# Patient Record
Sex: FEMALE | Race: WHITE | Employment: OTHER | URBAN - METROPOLITAN AREA
[De-identification: names, ages, dates, MRNs, and addresses within clinical notes are randomized per-mention and may not be internally consistent; named-entity substitution may affect disease eponyms.]

---

## 2017-01-01 ENCOUNTER — TRANSFERRED RECORDS (OUTPATIENT)
Dept: HEALTH INFORMATION MANAGEMENT | Facility: CLINIC | Age: 82
End: 2017-01-01

## 2017-01-01 ENCOUNTER — TELEPHONE (OUTPATIENT)
Dept: FAMILY MEDICINE | Facility: CLINIC | Age: 82
End: 2017-01-01

## 2017-01-01 ENCOUNTER — OFFICE VISIT (OUTPATIENT)
Dept: FAMILY MEDICINE | Facility: CLINIC | Age: 82
End: 2017-01-01
Payer: MEDICARE

## 2017-01-01 ENCOUNTER — DOCUMENTATION ONLY (OUTPATIENT)
Dept: OTHER | Facility: CLINIC | Age: 82
End: 2017-01-01

## 2017-01-01 ENCOUNTER — DOCUMENTATION ONLY (OUTPATIENT)
Dept: CARDIOLOGY | Facility: CLINIC | Age: 82
End: 2017-01-01

## 2017-01-01 VITALS
HEIGHT: 63 IN | DIASTOLIC BLOOD PRESSURE: 81 MMHG | SYSTOLIC BLOOD PRESSURE: 132 MMHG | OXYGEN SATURATION: 100 % | TEMPERATURE: 98.2 F | HEART RATE: 75 BPM | WEIGHT: 150 LBS | BODY MASS INDEX: 26.58 KG/M2

## 2017-01-01 VITALS
WEIGHT: 150 LBS | HEIGHT: 63 IN | BODY MASS INDEX: 26.58 KG/M2 | HEART RATE: 61 BPM | SYSTOLIC BLOOD PRESSURE: 112 MMHG | TEMPERATURE: 97.6 F | DIASTOLIC BLOOD PRESSURE: 62 MMHG | OXYGEN SATURATION: 95 %

## 2017-01-01 DIAGNOSIS — K20.80 RADIATION-INDUCED ESOPHAGITIS: ICD-10-CM

## 2017-01-01 DIAGNOSIS — F33.41 RECURRENT MAJOR DEPRESSIVE DISORDER, IN PARTIAL REMISSION (H): ICD-10-CM

## 2017-01-01 DIAGNOSIS — M34.9 SCLERODERMA (H): ICD-10-CM

## 2017-01-01 DIAGNOSIS — T66.XXXA RADIATION-INDUCED ESOPHAGITIS: ICD-10-CM

## 2017-01-01 DIAGNOSIS — Z71.89 ACP (ADVANCE CARE PLANNING): Chronic | ICD-10-CM

## 2017-01-01 DIAGNOSIS — F33.41 RECURRENT MAJOR DEPRESSIVE DISORDER, IN PARTIAL REMISSION (H): Primary | ICD-10-CM

## 2017-01-01 DIAGNOSIS — I10 BENIGN ESSENTIAL HYPERTENSION: ICD-10-CM

## 2017-01-01 DIAGNOSIS — I73.00 RAYNAUD'S SYNDROME: ICD-10-CM

## 2017-01-01 DIAGNOSIS — I26.99 OTHER ACUTE PULMONARY EMBOLISM WITHOUT ACUTE COR PULMONALE (H): ICD-10-CM

## 2017-01-01 DIAGNOSIS — C34.92 NON-SMALL CELL CARCINOMA OF LUNG, STAGE 4, LEFT (H): ICD-10-CM

## 2017-01-01 DIAGNOSIS — C34.92 PRIMARY CANCER OF LEFT LUNG METASTATIC TO OTHER SITE (H): Primary | ICD-10-CM

## 2017-01-01 PROCEDURE — 99214 OFFICE O/P EST MOD 30 MIN: CPT | Performed by: INTERNAL MEDICINE

## 2017-01-01 PROCEDURE — 99213 OFFICE O/P EST LOW 20 MIN: CPT | Performed by: INTERNAL MEDICINE

## 2017-01-01 RX ORDER — METOPROLOL SUCCINATE 25 MG/1
25 TABLET, EXTENDED RELEASE ORAL 2 TIMES DAILY
Qty: 45 TABLET | Refills: 0 | COMMUNITY

## 2017-01-01 RX ORDER — NIFEDIPINE 30 MG/1
30 TABLET, EXTENDED RELEASE ORAL DAILY
COMMUNITY
Start: 2017-01-01

## 2017-01-01 RX ORDER — POLYETHYLENE GLYCOL 3350 17 G/17G
1 POWDER, FOR SOLUTION ORAL DAILY
Qty: 510 G | Refills: 1 | COMMUNITY

## 2017-01-01 RX ORDER — BUPROPION HYDROCHLORIDE 100 MG/1
100 TABLET ORAL DAILY
Qty: 90 TABLET | COMMUNITY
End: 2017-01-01

## 2017-01-01 RX ORDER — UREA 10 %
1000 LOTION (ML) TOPICAL DAILY
COMMUNITY
End: 2017-01-01

## 2017-01-01 RX ORDER — BUPROPION HYDROCHLORIDE 100 MG/1
100 TABLET ORAL DAILY
Qty: 90 TABLET | Refills: 1 | Status: SHIPPED | OUTPATIENT
Start: 2017-01-01

## 2017-01-01 RX ORDER — ASPIRIN 81 MG
200 TABLET, DELAYED RELEASE (ENTERIC COATED) ORAL DAILY
Qty: 60 TABLET | Refills: 1 | COMMUNITY

## 2017-01-01 RX ORDER — PANTOPRAZOLE SODIUM 40 MG/1
40 TABLET, DELAYED RELEASE ORAL 2 TIMES DAILY
Qty: 30 TABLET | Refills: 1 | COMMUNITY
End: 2017-01-01

## 2017-01-01 RX ORDER — BENZONATATE 100 MG/1
100 CAPSULE ORAL 3 TIMES DAILY PRN
Qty: 42 CAPSULE | COMMUNITY
End: 2017-01-01

## 2017-01-01 RX ORDER — LORAZEPAM 1 MG/1
0.5 TABLET ORAL EVERY 6 HOURS PRN
Qty: 4 TABLET | Refills: 0 | COMMUNITY
End: 2017-01-01

## 2017-01-01 RX ORDER — SENNOSIDES A AND B 8.6 MG/1
2 TABLET, FILM COATED ORAL 2 TIMES DAILY
Qty: 120 TABLET | COMMUNITY

## 2017-01-01 RX ORDER — LIDOCAINE 50 MG/G
PATCH TOPICAL
Qty: 30 PATCH | Refills: 0 | COMMUNITY
End: 2017-01-01

## 2017-01-01 RX ORDER — HYDROMORPHONE HYDROCHLORIDE 2 MG/1
4 TABLET ORAL
Qty: 18 TABLET | Refills: 0 | COMMUNITY

## 2017-01-01 RX ORDER — FENTANYL 12.5 UG/1
1 PATCH TRANSDERMAL
Refills: 0 | COMMUNITY
End: 2017-01-01

## 2017-01-02 PROBLEM — K20.80 RADIATION-INDUCED ESOPHAGITIS: Status: ACTIVE | Noted: 2017-01-01

## 2017-01-02 PROBLEM — T66.XXXA RADIATION-INDUCED ESOPHAGITIS: Status: ACTIVE | Noted: 2017-01-01

## 2017-01-02 NOTE — PATIENT INSTRUCTIONS
If you are having more bowel problems let us know    Stop the calcium and vitamin D as this may constipate you    You can try stopping the pain patch and if the pain worsens we can always resume it.    Don't be afraid to use the dilaudid as needed, especially once you are off the pain patch    Make sure to participate in physical therapy as much as you can    Call if you need anything    Juanjo Castañeda M.D.

## 2017-01-02 NOTE — MR AVS SNAPSHOT
After Visit Summary   1/2/2017    Stephanie Ruiz    MRN: [id number]           Patient Information     Date Of Birth          1935        Visit Information        Provider Department      1/2/2017 2:00 PM Juanjo Castañeda MD [facility]        Today's Diagnoses     Primary cancer of left lung metastatic to other site (H)    -  1     Non-small cell carcinoma of lung, stage 4, left (H)         Other acute pulmonary embolism without acute cor pulmonale (H)         Benign essential hypertension           Care Instructions    If you are having more bowel problems let us know    Stop the calcium and vitamin D as this may constipate you    You can try stopping the pain patch and if the pain worsens we can always resume it.    Don't be afraid to use the dilaudid as needed, especially once you are off the pain patch    Make sure to participate in physical therapy as much as you can    Call if you need anything    Juanjo Castañeda M.D.          Follow-ups after your visit        Who to contact     If you have questions or need follow up information about today's clinic visit or your schedule please contact [facility] directly at [phone].  Normal or non-critical lab and imaging results will be communicated to you by MyChart, letter or phone within 4 business days after the clinic has received the results. If you do not hear from us within 7 days, please contact the clinic through [employer] or phone. If you have a critical or abnormal lab result, we will notify you by phone as soon as possible.  Submit refill requests through [employer] or call your pharmacy and they will forward the refill request to us. Please allow 3 business days for your refill to be completed.          Additional Information About Your Visit        MyChart Information     [employer] lets you send messages to your doctor, view your test results, renew your prescriptions, schedule appointments and more. To sign up, go  "to [email or web address] . Click on \"Log in\" on the left side of the screen, which will take you to the Welcome page. Then click on \"Sign up Now\" on the right side of the page.     You will be asked to enter the access code listed below, as well as some personal information. Please follow the directions to create your username and password.     Your access code is: [email or web address]  Expires: 2017  2:25 PM     Your access code will  in 90 days. If you need help or a new code, please call your [place] clinic or [phone].        Your Vitals Were     Pulse Temperature Height BMI (Body Mass Index) Pulse Oximetry Breastfeeding?    61 97.6  F (36.4  C) (Oral) 5' 3\" (1.6 m) 26.58 kg/m2 95% No       Blood Pressure from Last 3 Encounters:   17 112/62   16 126/85   16 145/75    Weight from Last 3 Encounters:   17 150 lb (68.04 kg)   16 150 lb (68.04 kg)   16 151 lb (68.493 kg)              Today, you had the following     No orders found for display         Today's Medication Changes          These changes are accurate as of: 17  2:25 PM.  If you have any questions, ask your nurse or doctor.               These medicines have changed or have updated prescriptions.        Dose/Directions    cyanocolbalamin 500 MCG tablet   Commonly known as:  vitamin  B-12   This may have changed:  how much to take   Changed by:  Juanjo Castañeda MD        Dose:  1000 mcg   Take 2 tablets (1,000 mcg) by mouth daily   Refills:  0       metoprolol 25 MG 24 hr tablet   Commonly known as:  TOPROL-XL   This may have changed:  See the new instructions.   Used for:  Benign essential hypertension   Changed by:  Juanjo Castañeda MD        Dose:  50 mg   Take 2 tablets (50 mg) by mouth daily   Quantity:  45 tablet   Refills:  0       NIFEdipine ER 60 MG 24 hr tablet   Commonly known as:  ADALAT CC   This may have changed:  Another medication with the same name was removed. Continue taking " this medication, and follow the directions you see here.   Used for:  Raynaud's syndrome   Changed by:  Juanjo Castañeda MD        Dose:  60 mg   Take 1 tablet (60 mg) by mouth daily   Quantity:  90 tablet   Refills:  3       zolpidem 10 MG tablet   Commonly known as:  AMBIEN   This may have changed:  See the new instructions.   Used for:  Primary insomnia        TAKE 1/2 TABLET BY MOUTH AT BEDTIME AS NEEDED FOR SLEEP   Quantity:  90 tablet   Refills:  0         Stop taking these medicines if you haven't already. Please contact your care team if you have questions.     BLINK TEARS 0.25 % Soln ophthalmic solution   Generic drug:  polyethylene glycol 400   Stopped by:  Juanjo Castañeda MD           CLINDAMYCIN HCL PO   Stopped by:  Juanjo Castañeda MD           omeprazole 20 MG CR capsule   Commonly known as:  priLOSEC   Stopped by:  Juanjo Castañeda MD           rivaroxaban ANTICOAGULANT 20 MG Tabs tablet   Commonly known as:  XARELTO   Stopped by:  Juanjo Castañeda MD                    Primary Care Provider Office Phone # Fax #    Juanjo Castañeda [phone]       [facility] [address]  [facility] [address]        Thank you!     Thank you for choosing [facility]  for your care. Our goal is always to provide you with excellent care. Hearing back from our patients is one way we can continue to improve our services. Please take a few minutes to complete the written survey that you may receive in the mail after your visit with us. Thank you!             Your Updated Medication List - Protect others around you: Learn how to safely use, store and throw away your medicines at www.disposemymeds.org.          This list is accurate as of: 1/2/17  2:25 PM.  Always use your most recent med list.                   Brand Name Dispense Instructions for use    acetaminophen 325 MG tablet    TYLENOL     Take 2 tablets (650 mg) by mouth every 4 hours  as needed for mild pain or pain Do not take more than 4,000 mg of acetaminophen (Tylenol) in any 24 hour period, and do not consume alcohol while taking acetaminophen due to risk of liver toxicity.       benzonatate 100 MG capsule    TESSALON    42 capsule    Take 1 capsule (100 mg) by mouth 3 times daily as needed for cough       buPROPion 100 MG tablet    WELLBUTRIN    90 tablet    Take 1 tablet (100 mg) by mouth daily       calcium 600 + D 600-400 MG-UNIT per tablet   Generic drug:  calcium-vitamin D      Take 1 tablet by mouth 2 times daily.       CENTRUM Chew      Take 1 tablet by mouth daily       cyanocolbalamin 500 MCG tablet    vitamin  B-12     Take 2 tablets (1,000 mcg) by mouth daily       docusate sodium 100 MG tablet    COLACE    60 tablet    Take 200 mg by mouth daily       DURAGESIC 12 mcg/hr 72 hr patch   Generic drug:  fentaNYL      Place 1 patch onto the skin every 72 hours       * EFFEXOR  MG 24 hr capsule   Generic drug:  venlafaxine      Take 150 mg by mouth every morning       * venlafaxine 75 MG Tb24 24 hr tablet    EFFEXOR-ER     Take 75 mg by mouth daily (with dinner)       HYDROmorphone 2 MG tablet    DILAUDID    18 tablet    Take 2 tablets (4 mg) by mouth every 3 hours as needed for pain       levothyroxine 125 MCG tablet    SYNTHROID/LEVOTHROID    90 tablet    Take 1 tablet (125 mcg) by mouth daily       lidocaine 5 % Patch    LIDODERM    30 patch    Apply up to 1 patches to painful area at once for up to 12 h within a 24 h period.  Remove after 12 hours.       LORazepam 1 MG tablet    ATIVAN    4 tablet    Take 0.5 tablets (0.5 mg) by mouth every 6 hours as needed for anxiety Take 30 minutes prior to departure.  Do not operate a vehicle after taking this medication       metoprolol 25 MG 24 hr tablet    TOPROL-XL    45 tablet    Take 2 tablets (50 mg) by mouth daily       MIRALAX powder   Generic drug:  polyethylene glycol     510 g    Take 17 g (1 capful) by mouth daily        NIFEdipine ER 60 MG 24 hr tablet    ADALAT CC    90 tablet    Take 1 tablet (60 mg) by mouth daily       PROTONIX 40 MG EC tablet   Generic drug:  pantoprazole     30 tablet    Take 1 tablet (40 mg) by mouth 2 times daily Take 30-60 minutes before a meal.       senna 8.6 MG tablet    SENOKOT    120 tablet    Take 2 tablets by mouth 2 times daily       Thiamine 50 MG Caps      Take 100 mg by mouth every evening       zolpidem 10 MG tablet    AMBIEN    90 tablet    TAKE 1/2 TABLET BY MOUTH AT BEDTIME AS NEEDED FOR SLEEP       * Notice:  This list has 2 medication(s) that are the same as other medications prescribed for you. Read the directions carefully, and ask your doctor or other care provider to review them with you.

## 2017-01-02 NOTE — PROGRESS NOTES
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  Follow up needed: none  Other Healthcare Providers Involved in Patient s Care:         Specialist appointment - March with Dr. Arizmendi of oncology  Update since discharge: improved.     Post Discharge Medication Reconciliation: discharge medications reconciled and changed, per note/orders (see AVS).  Plan of care communicated with patient     Coding guidelines for this visit:  Type of Medical   Decision Making Face-to-Face Visit       within 7 Days of discharge Face-to-Face Visit        within 14 days of discharge   Moderate Complexity [id number]   High Complexity [id number]          Stephanie Ruiz is a 81 year old female who presents  For hosp then tcu follow up, out about 2 weeks, as noted hosp anw for radiation induced esoph with chest pain and dysphagia, then out to tcu and now back home with home care.  Has boyfriend who also helps.  She has known alfredo lung ca, non small cell with bx proven met at T8, had xrt both areas, then due to chest pain and dysphagia hosp as noted.  Now on pain meds, back home.  Has chronic low back pain and abdomen pain for years, not new or changed.  Chest pain much improved, eating better, no n/v, bm sluggish, no f,c,s.  Notes fatigue and tired, using walker and has home physical therapy, can get around some with walker.  Not really using  the pain patch often, usually forgets, also not using the dilaudid.  Is taking ambien 10mg and needs that dose to sleep, has done it for years.  I again explained to patient risk of confusion, falls, etc.  She has prior small pulmonary embolis, but off xarelto since hosp stay, does not want to be on it.  Was having more depression but then got wellbutrin in tcu and that is improved.  She has raynauds and scleroderma, has been stable.    Past Medical History   Diagnosis Date     Urinary, incontinence, stress female      Hypothyroidism      HTN (hypertension)      Hypercholesteremia      Depression, major, in partial remission (H)      GERD (gastroesophageal reflux disease)      Idiopathic anaphylaxis      Chest pain 2009     neg r/o, neg nuclear est     Abdominal pain 2006     ct abd neg, egd done, mrcp nl, be tics, ugi hh     Obesity 2007     lap band surgery [facility]     Cholesterol embolism 2007     carotids neg     Pacemaker 2009     [place]     Colonic polyp 2011     [place], one polyp     Spinal stenosis of lumbar region 2006     by mri     Leg pain, bilateral 2012     eval by Dr. Villegas and micki nl.     Carotid bruit 2012     bilat carotid us no signif stenosis     Subclavian vein stenosis, right 2/13     seen on ct     Lung mass 2/13     seen on ct, had fu Dr. Arizmendi of onc, fu done 2015 and per pt no fu needed     SONIA (iron deficiency anaemia) 2013     colonoscopy with 2mm polyp, egd with previous surgical changes and gastric ulcer     SOB (shortness of breath) 2/12     nl est thallium, nl ef     Raynaud's disease      Dyspnea on exertion      fu [facility] 10/14 and felt to be multifact, due to decond, pain, chest wall limitations, mild copd     Scleroderma (H)      enlarged fingers, edematous hands     Aortic valve replaced      Diastolic heart failure (H) 2013     hosp [facility]     S/P coronary angiogram 10/13     pre op, min cad     Insomnia      Dysphagia 2014     eval [facility], egd no cause found     Pseudoclaudication       l2-l3, eval [place]     Pulmonary hypertension (H)      Acute coronary syndrome (H)      Aortic stenosis      Diastolic heart failure (H)      Pulmonary emboli (H) 8/16     hosp [facility], left lower lobe     Non-small cell carcinoma of lung, stage 4, left (H)      xrt done 11/16      Primary cancer of left lung metastatic to other site (H)      t8 vert, had xrt 11/16     Past Surgical History   Procedure Laterality Date     Spinal stenosis surgery  2012     [place]     Stress urinary incont surgery  2002     Torn achilles surgery       Cholecystectomy       Rhinoplasty       Cataract iol, rt/lt       Retinal surgery  2007     Lid lag surgery  2008 and 2009     Kim en y bowel  2007      [facility]     Macular hole surgery  2010     Hrw pacemaker permanent  2009     Colonoscopy       Replace valve aortic  11/1/2013     Procedure: REPLACE VALVE AORTIC;  AORTIC VALVE REPLACEMENT WITH LISA , PERICARDIAL PATCH ANNULOPLASY, VENTRICULAR PACING WIRES.  (ON PUMP) ;  Surgeon: Anthony West MD;  Location: [facility] OR     Implant pacemaker       Social History     Social History     Marital Status:      Spouse Name: N/A     Number of Children: 2     Years of Education: N/A     Occupational History      Retired     Social History Main Topics     Smoking status: Former Smoker -- 1.50 packs/day for 41 years     Quit date: 06/01/1985     Smokeless tobacco: Never Used     Alcohol Use: Yes      Comment: 1 weekly     Drug Use: No     Sexual Activity: No     Other Topics Concern     Caffeine Concern No     none recently     Exercise Yes     Seat Belt Yes     Social History Narrative     Current Outpatient Prescriptions   Medication Sig Dispense Refill     benzonatate (TESSALON) 100 MG capsule Take 1 capsule (100 mg) by mouth 3 times daily as needed for cough 42 capsule      HYDROmorphone (DILAUDID) 2 MG tablet Take 2 tablets (4 mg) by mouth every 3 hours as needed for pain 18 tablet 0     docusate sodium (COLACE) 100 MG tablet Take  200 mg by mouth daily 60 tablet 1     fentaNYL (DURAGESIC) 12 mcg/hr 72 hr patch Place 1 patch onto the skin every 72 hours  0     lidocaine (LIDODERM) 5 % Patch Apply up to 1 patches to painful area at once for up to 12 h within a 24 h period.  Remove after 12 hours. 30 patch 0     LORazepam (ATIVAN) 1 MG tablet Take 0.5 tablets (0.5 mg) by mouth every 6 hours as needed for anxiety Take 30 minutes prior to departure.  Do not operate a vehicle after taking this medication 4 tablet 0     metoprolol (TOPROL-XL) 25 MG 24 hr tablet Take 2 tablets (50 mg) by mouth daily 45 tablet 0     polyethylene glycol (MIRALAX) powder Take 17 g (1 capful) by mouth daily 510 g 1     pantoprazole (PROTONIX) 40 MG EC tablet Take 1 tablet (40 mg) by mouth 2 times daily Take 30-60 minutes before a meal. 30 tablet 1     senna (SENOKOT) 8.6 MG tablet Take 2 tablets by mouth 2 times daily 120 tablet      Thiamine 50 MG CAPS Take 100 mg by mouth every evening       cyanocolbalamin (VITAMIN  B-12) 500 MCG tablet Take 2 tablets (1,000 mcg) by mouth daily       buPROPion (WELLBUTRIN) 100 MG tablet Take 1 tablet (100 mg) by mouth daily 90 tablet      zolpidem (AMBIEN) 10 MG tablet TAKE 1/2 TABLET BY MOUTH AT BEDTIME AS NEEDED FOR SLEEP (Patient taking differently: 10 mg BY MOUTH AT BEDTIME AS NEEDED FOR SLEEP) 90 tablet 0     levothyroxine (SYNTHROID, LEVOTHROID) 125 MCG tablet Take 1 tablet (125 mcg) by mouth daily 90 tablet 2     NIFEdipine (ADALAT CC) 60 MG 24 hr tablet Take 1 tablet (60 mg) by mouth daily 90 tablet 3     acetaminophen (TYLENOL) 325 MG tablet Take 2 tablets (650 mg) by mouth every 4 hours as needed for mild pain or pain Do not take more than 4,000 mg of acetaminophen (Tylenol) in any 24 hour period, and do not consume alcohol while taking acetaminophen due to risk of liver toxicity.       venlafaxine (EFFEXOR-ER) 75 MG TB24 Take 75 mg by mouth daily (with dinner)       Multiple Vitamins-Minerals (CENTRUM) CHEW Take 1 tablet by  "mouth daily       calcium-vitamin D (CALCIUM 600 + D) 600-400 MG-UNIT per tablet Take 1 tablet by mouth 2 times daily.       venlafaxine (EFFEXOR XR) 150 MG 24 hr capsule Take 150 mg by mouth every morning        [DISCONTINUED] metoprolol (TOPROL-XL) 25 MG 24 hr tablet TAKE 1/2 TABLET BY MOUTH TWICE DAILY 45 tablet 0     [DISCONTINUED] NIFEdipine osmotic (NIFEDICAL XL) 30 MG 24 hr tablet Take 30 mg by mouth At Bedtime  90 tablet 1     [DISCONTINUED] cyanocolbalamin (VITAMIN  B-12) 500 MCG tablet Take 500 mcg by mouth daily.       [DISCONTINUED] BELLADONNA ALKALOIDS 1 tablet every 6 hours as needed.       Allergies   Allergen Reactions     Codeine Camsylate Unknown     Novocain [Procaine]      With epinephrine:  Tachycardia, chest pressure     Penicillins Hives     FAMILY HISTORY NOTED AND REVIEWED    REVIEW OF SYSTEMS: above    PHYSICAL EXAM    /62 mmHg  Pulse 61  Temp(Src) 97.6  F (36.4  C) (Oral)  Ht 5' 3\" (1.6 m)  Wt 150 lb (68.04 kg)  BMI 26.58 kg/m2  SpO2 95%  Breastfeeding? No    Patient appears non toxic  Lungs clear  cv reglar rate and rhythm, no jvp or edema  Abdomen non-tender    ASSESSMENT:  1. Lung ca with t8 met, s/p xrt to both with rad induced esoph, now improved.  To follow up with onc March.  Has fairly good pain control and will try less and see if bm better and not worse  2. Chronic low back pain, contributing  3. Scleroderma, stable  4. Pulmonary embolis, off med, higher risk of this but wants to be off anticoag  5. Rad induced esoph, stable  6. Hypertension, no need for tight control, if drops can lower meds    PLAN:  Ok to try off fentanyl patch, use diluadid prn  Call if bm issues  Stop calcium and vit d  Follow up onc  Call if problems    Juanjo Castañeda M.D.                  "

## 2017-01-03 NOTE — TELEPHONE ENCOUNTER
Pending Prescriptions:                       Disp   Refills    buPROPion (WELLBUTRIN) 100 MG tablet      90 tab*             Sig: Take 1 tablet (100 mg) by mouth daily          Last Written Prescription Date:    Last Fill Quantity: ,   # refills:   Last Office Visit with [facility], [facility] or [facility] prescribing provider: 01/02/17  Future Office visit:       Routing refill request to provider for review/approval because:  Medication is reported/historical

## 2017-01-03 NOTE — TELEPHONE ENCOUNTER
Reason for Call:  Medication or medication refill:    Do you use a [facility]?  Name of the pharmacy and phone number for the current request:         [facility] [address] [place] [address] [address] AT [facility]             Name of the medication requested:  buPROPion (WELLBUTRIN) 100 MG tablet 90 tablet    --      Sig: Take 1 tablet (100 mg) by mouth daily         Other request:  Please refax script to the pharmacy    Can we leave a detailed message on this number? YES    Phone number patient can be reached at: Home number on file [phone] (home)    Best Time: anytiem    Call taken on 1/3/2017 at 3:14 PM by Thu Lance

## 2017-01-04 PROBLEM — F33.41 RECURRENT MAJOR DEPRESSIVE DISORDER, IN PARTIAL REMISSION (H): Status: ACTIVE | Noted: 2017-01-01

## 2017-01-04 NOTE — TELEPHONE ENCOUNTER
Routing refill request to provider for review/approval because:  Medication is reported/historical     Please approve if appropriate  Shara Solis RN

## 2017-02-23 NOTE — TELEPHONE ENCOUNTER
PA has been submitted to [employer] ([facility]) for further review.    Javad Conroy, CMA

## 2017-02-28 NOTE — TELEPHONE ENCOUNTER
PA has been approved for the pantoprazole from 11/25/16 - 2/23/18. I called the pharmacy to let them know.  Javad Conroy, CMA

## 2017-03-20 NOTE — TELEPHONE ENCOUNTER
Reason for Call:  Other prescription    Detailed comments: Stephanie Ruiz has stage 4 cancer and has mixed up her medication. She needs to speak with someone.       Phone Number Patient can be reached at: Home number on file [phone] (home)    Best Time: ASAP     Can we leave a detailed message on this number? YES    Call taken on 3/20/2017 at 10:39 AM by Lucy Almonte, Patient Representative

## 2017-03-20 NOTE — TELEPHONE ENCOUNTER
Needs refill of Pantoprazole.  I called this to pharmacy; (looks like she may have a refill remaining)    Came home from [place] 3 days ago. Has a shin wound that is a couple weeks old.  States she should have this checked.    Booked tomorrow with PCP.  She states she she actually hasn't looked at her leg in many days.  If completely healed, she will cancel appointment.  Lizzie Ellison RN

## 2017-04-12 NOTE — TELEPHONE ENCOUNTER
I believe her oncologist is giving her this and I would prefer she go through him for this    Juanjo Castañeda M.D.

## 2017-04-12 NOTE — TELEPHONE ENCOUNTER
PCP Per [facility] nurse, pt was given Hydromorphone at most recent hospitalization due to uncontrolled pain.  Prescription is for 2mg tablets 2 tablets q3h given 60 tabs, and Nurse thinks pt is taking 10 a day  Pt's issue in hospital was pain management.  Pt has Visit scheduled with PCP, and will be in on Monday at 2pm, but will run out of meds before that.   Nurse thinks pt has about 30 left, and taking 10 per day, at this rate pt would need up to 20 tabs to get to next OV on Monday.  Pt is not currently on hospice at this time.      Also need orders for homecare orders, if all below are ok  Please advise  Odessa Rizzo RN

## 2017-04-12 NOTE — TELEPHONE ENCOUNTER
Reason for Call: Request for an order or referral:    Order or referral being requested: [facility] orders    Date needed: as soon as possible    Has the patient been seen by the PCP for this problem? YES    Additional comments: galilea needs orders for SK,PT,OT and HHA treat and eval.  Galilea needs to order rx for HYDROmorphone for [facility]/ to  at [facility]. Please advise    Phone number Patient can be reached at:  Other phone number:  [phone]    Best Time:  any    Can we leave a detailed message on this number?  YES    Call taken on 4/12/2017 at 3:03 PM by Demetrius Astudillo

## 2017-04-12 NOTE — TELEPHONE ENCOUNTER
Call to homecare nurse and notified of provider message.  Did give name of oncologist, Dr. Arizmendi from [facility], and phone number to call.  No further questions.  Elaine Bernstein RN

## 2017-04-17 NOTE — MR AVS SNAPSHOT
"              After Visit Summary   4/17/2017    Stephanie Ruiz    MRN: [id number]           Patient Information     Date Of Birth          1935        Visit Information        Provider Department      4/17/2017 2:00 PM Juanjo Castañeda MD [facility]        Care Instructions    Try using citrucil, mix this in water or juice once daily.  It does take a few days to work and is best if you take it every day.  Let's see if this helps both the cramping and the bowels.    Follow up with Dr. Arizmendi, call me if I can help.    Juanjo Castañeda M.D.          Follow-ups after your visit        Who to contact     If you have questions or need follow up information about today's clinic visit or your schedule please contact [facility] directly at [phone].  Normal or non-critical lab and imaging results will be communicated to you by [employer], letter or phone within 4 business days after the clinic has received the results. If you do not hear from us within 7 days, please contact the clinic through MyChart or phone. If you have a critical or abnormal lab result, we will notify you by phone as soon as possible.  Submit refill requests through [employer] or call your pharmacy and they will forward the refill request to us. Please allow 3 business days for your refill to be completed.          Additional Information About Your Visit        MyChart Information     [employer] lets you send messages to your doctor, view your test results, renew your prescriptions, schedule appointments and more. To sign up, go to [email or web address] . Click on \"Log in\" on the left side of the screen, which will take you to the Welcome page. Then click on \"Sign up Now\" on the right side of the page.     You will be asked to enter the access code listed below, as well as some personal information. Please follow the directions to create your username and password.     Your access code is: [email or web address]  Expires: 7/16/2017  " "2:21 PM     Your access code will  in 90 days. If you need help or a new code, please call your [place] clinic or [phone].        Care EveryWhere ID     This is your Care EveryWhere ID. This could be used by other organizations to access your [place] medical records  [id number]        Your Vitals Were     Pulse Temperature Height Pulse Oximetry Breastfeeding? BMI (Body Mass Index)    75 98.2  F (36.8  C) (Oral) 5' 3\" (1.6 m) 100% No 26.57 kg/m2       Blood Pressure from Last 3 Encounters:   17 132/81   17 112/62   16 126/85    Weight from Last 3 Encounters:   17 150 lb (68 kg)   17 150 lb (68 kg)   16 150 lb (68 kg)              Today, you had the following     No orders found for display         Today's Medication Changes          These changes are accurate as of: 17  2:21 PM.  If you have any questions, ask your nurse or doctor.               These medicines have changed or have updated prescriptions.        Dose/Directions    zolpidem 10 MG tablet   Commonly known as:  AMBIEN   This may have changed:  See the new instructions.   Used for:  Primary insomnia        TAKE 1/2 TABLET BY MOUTH AT BEDTIME AS NEEDED FOR SLEEP   Quantity:  90 tablet   Refills:  0         Stop taking these medicines if you haven't already. Please contact your care team if you have questions.     benzonatate 100 MG capsule   Commonly known as:  TESSALON   Stopped by:  Juanjo Castañeda MD           calcium 600 + D 600-400 MG-UNIT per tablet   Generic drug:  calcium-vitamin D   Stopped by:  Juanjo Castañeda MD           CENTRUM Chew   Stopped by:  Juanjo Castañeda MD           cyanocolbalamin 500 MCG tablet   Commonly known as:  vitamin  B-12   Stopped by:  Juanjo Castañeda MD           DURAGESIC 12 mcg/hr 72 hr patch   Generic drug:  fentaNYL   Stopped by:  Juanjo Castañeda MD           lidocaine 5 % Patch   Commonly known as:  LIDODERM   Stopped by:  Maddy, " Juanjo Jimenez MD           LORazepam 1 MG tablet   Commonly known as:  ATIVAN   Stopped by:  Juanjo Castañeda MD           Thiamine 50 MG Caps   Stopped by:  Juanjo Castañeda MD                    Primary Care Provider Office Phone # Fax #    Juanjo Castañeda [phone]       [facility] [address] [facility] [address]  [facility] [address]        Thank you!     Thank you for choosing [facility]  for your care. Our goal is always to provide you with excellent care. Hearing back from our patients is one way we can continue to improve our services. Please take a few minutes to complete the written survey that you may receive in the mail after your visit with us. Thank you!             Your Updated Medication List - Protect others around you: Learn how to safely use, store and throw away your medicines at www.disposemymeds.org.          This list is accurate as of: 4/17/17  2:21 PM.  Always use your most recent med list.                   Brand Name Dispense Instructions for use    acetaminophen 325 MG tablet    TYLENOL     Take 2 tablets (650 mg) by mouth every 4 hours as needed for mild pain or pain Do not take more than 4,000 mg of acetaminophen (Tylenol) in any 24 hour period, and do not consume alcohol while taking acetaminophen due to risk of liver toxicity.       buPROPion 100 MG tablet    WELLBUTRIN    90 tablet    Take 1 tablet (100 mg) by mouth daily       docusate sodium 100 MG tablet    COLACE    60 tablet    Take 200 mg by mouth daily       * EFFEXOR  MG 24 hr capsule   Generic drug:  venlafaxine      Take 150 mg by mouth every morning       * venlafaxine 75 MG Tb24 24 hr tablet    EFFEXOR-ER     Take 75 mg by mouth daily (with dinner)       HYDROmorphone 2 MG tablet    DILAUDID    18 tablet    Take 2 tablets (4 mg) by mouth every 3 hours as needed for pain       levothyroxine 125 MCG tablet    SYNTHROID/LEVOTHROID    90 tablet    Take 1 tablet (125 mcg)  by mouth daily       metoprolol 25 MG 24 hr tablet    TOPROL-XL    45 tablet    Take 25 mg by mouth 2 times daily       MIRALAX powder   Generic drug:  polyethylene glycol     510 g    Take 17 g (1 capful) by mouth daily       MS CONTIN PO      Take 15 mg by mouth every 12 hours       NIFEdipine ER 60 MG 24 hr tablet    ADALAT CC    90 tablet    Take 1 tablet (60 mg) by mouth daily       pantoprazole 40 MG EC tablet    PROTONIX    90 tablet    Take 1 tablet (40 mg) by mouth 2 times daily Take 30-60 minutes before a meal.       senna 8.6 MG tablet    SENOKOT    120 tablet    Take 2 tablets by mouth 2 times daily       zolpidem 10 MG tablet    AMBIEN    90 tablet    TAKE 1/2 TABLET BY MOUTH AT BEDTIME AS NEEDED FOR SLEEP       * Notice:  This list has 2 medication(s) that are the same as other medications prescribed for you. Read the directions carefully, and ask your doctor or other care provider to review them with you.

## 2017-04-17 NOTE — PROGRESS NOTES
SUBJECTIVE:                                                    Stephanie Ruiz is a 81 year old female who presents to clinic today for the following health issues:          Hospital Follow-up Visit:    Hospital/Nursing Home/IP Rehab Facility: [facility]  Date of Admission: 4/7/17  Date of Discharge: 4/10/17  Reason(s) for Admission: pains in chest            Problems taking medications regularly:  None       Medication changes since discharge: None       Problems adhering to non-medication therapy:  None    Summary of hospitalization:  [facility]  Diagnostic Tests/Treatments reviewed.  Follow up needed: none  Other Healthcare Providers Involved in Patient s Care:         Specialist appointment - Dr. Arizmendi  Update since discharge: improved.     Post Discharge Medication Reconciliation: discharge medications reconciled and changed, per note/orders (see AVS).  Plan of care communicated with patient     Coding guidelines for this visit:  Type of Medical   Decision Making Face-to-Face Visit       within 7 Days of discharge Face-to-Face Visit        within 14 days of discharge   Moderate Complexity [id number]   High Complexity [id number]        Stephanie Ruiz is a 81 year old female who presents for hosp follow up.  I reviewed anw dc on [facility].  Has pain team helping as well and Dr. Arizmendi of onc.  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  Had xrt Nov to c spine, but then developed esoph and hosp, then out and then back in due to more pain 4/7 to 4/10, now out in her condo, single level.  Has boyfriend with her in condo and helps.  In w.c today, needs help to get in and out of bed, toilet, but doing ok.  Still has pains, better then admit.  Bm can be erratic with chronic lower abdomen pain or years, then can have bm and then better, no b/b stools.  NO f,,cs.  No new c/o since dc, taking new pain meds now.  bm are moving.    Past Medical History:   Diagnosis Date      Abdominal pain 2006    ct abd neg, egd done, mrcp nl, be tics, ugi hh     Acute coronary syndrome (H)      Aortic stenosis      Aortic valve replaced      Carotid bruit 2012    bilat carotid us no signif stenosis     Chest pain 2009    neg r/o, neg nuclear est     Cholesterol embolism 2007    carotids neg     Colonic polyp 2011 [place], one polyp     Depression, major, in partial remission (H)      Diastolic heart failure (H) 2013    hosp [facility]     Diastolic heart failure (H)      Dysphagia 2014    eval [place], egd no cause found     Dyspnea on exertion     fu [place] 10/14 and felt to be multifact, due to decond, pain, chest wall limitations, mild copd     GERD (gastroesophageal reflux disease)      HTN (hypertension)      Hypercholesteremia      Hypothyroidism      SONIA (iron deficiency anaemia) 2013    colonoscopy with 2mm polyp, egd with previous surgical changes and gastric ulcer     Idiopathic anaphylaxis      Insomnia      Leg pain, bilateral 2012    eval by Dr. Villegas and micki nl.     Lung mass 2/13    seen on ct, had fu Dr. Arizmendi of onc, fu done 2015 and per pt no fu needed     Non-small cell carcinoma of lung, stage 4, left (H)     xrt done 11/16      Obesity 2007    lap band surgery [facility]     Pacemaker 2009    [place]     Primary cancer of left lung metastatic to other site (H)     t8 vert, had xrt 11/16     Pseudoclaudication     l2-l3, eval [place]     Pulmonary emboli (H) 8/16    hosp [facility], left lower lobe     Pulmonary hypertension (H)      Raynaud's disease      S/P coronary angiogram 10/13    pre op, min cad     Scleroderma (H)     enlarged fingers, edematous hands     SOB (shortness of breath) 2/12    nl est thallium, nl ef     Spinal stenosis of lumbar region 2006    by mri     Subclavian vein stenosis, right 2/13    seen on ct     Urinary, incontinence, stress female      Past Surgical History:   Procedure Laterality Date     CATARACT IOL, RT/LT       CHOLECYSTECTOMY       COLONOSCOPY       HRW PACEMAKER  PERMANENT  2009     IMPLANT PACEMAKER       lid lag surgery  2008 and 2009     macular hole surgery  2010     REPLACE VALVE AORTIC  11/1/2013    Procedure: REPLACE VALVE AORTIC;  AORTIC VALVE REPLACEMENT WITH LISA , PERICARDIAL PATCH ANNULOPLASY, VENTRICULAR PACING WIRES.  (ON PUMP) ;  Surgeon: Anthony West MD;  Location: [facility] OR     retinal surgery  2007     RHINOPLASTY       DIOGENES EN Y BOWEL  2007     [facility]     spinal stenosis surgery  2012    [place]     stress urinary incont surgery  2002     torn achilles surgery       Social History     Social History     Marital status:      Spouse name: N/A     Number of children: 2     Years of education: N/A     Occupational History      Retired     Social History Main Topics     Smoking status: Former Smoker     Packs/day: 1.50     Years: 41.00     Quit date: 6/1/1985     Smokeless tobacco: Never Used     Alcohol use Yes      Comment: 1 weekly     Drug use: No     Sexual activity: No     Other Topics Concern     Caffeine Concern No     none recently     Exercise Yes     Seat Belt Yes     Social History Narrative     Current Outpatient Prescriptions   Medication Sig Dispense Refill     Morphine Sulfate (MS CONTIN PO) Take 15 mg by mouth every 12 hours       pantoprazole (PROTONIX) 40 MG EC tablet Take 1 tablet (40 mg) by mouth 2 times daily Take 30-60 minutes before a meal. 90 tablet 1     buPROPion (WELLBUTRIN) 100 MG tablet Take 1 tablet (100 mg) by mouth daily 90 tablet 1     HYDROmorphone (DILAUDID) 2 MG tablet Take 2 tablets (4 mg) by mouth every 3 hours as needed for pain 18 tablet 0     docusate sodium (COLACE) 100 MG tablet Take 200 mg by mouth daily 60 tablet 1     metoprolol (TOPROL-XL) 25 MG 24 hr tablet Take 25 mg by mouth 2 times daily  45 tablet 0     polyethylene glycol (MIRALAX) powder Take 17 g (1 capful) by mouth daily 510 g 1     senna (SENOKOT) 8.6 MG tablet Take 2 tablets by mouth 2 times daily 120 tablet      zolpidem (AMBIEN) 10 MG  "tablet TAKE 1/2 TABLET BY MOUTH AT BEDTIME AS NEEDED FOR SLEEP (Patient taking differently: 10 mg BY MOUTH AT BEDTIME AS NEEDED FOR SLEEP) 90 tablet 0     levothyroxine (SYNTHROID, LEVOTHROID) 125 MCG tablet Take 1 tablet (125 mcg) by mouth daily 90 tablet 2     NIFEdipine (ADALAT CC) 60 MG 24 hr tablet Take 1 tablet (60 mg) by mouth daily 90 tablet 3     acetaminophen (TYLENOL) 325 MG tablet Take 2 tablets (650 mg) by mouth every 4 hours as needed for mild pain or pain Do not take more than 4,000 mg of acetaminophen (Tylenol) in any 24 hour period, and do not consume alcohol while taking acetaminophen due to risk of liver toxicity.       venlafaxine (EFFEXOR-ER) 75 MG TB24 Take 75 mg by mouth daily (with dinner)       venlafaxine (EFFEXOR XR) 150 MG 24 hr capsule Take 150 mg by mouth every morning        [DISCONTINUED] BELLADONNA ALKALOIDS 1 tablet every 6 hours as needed.       Allergies   Allergen Reactions     Codeine Camsylate Unknown     Novocain [Procaine]      With epinephrine:  Tachycardia, chest pressure     Penicillins Hives     FAMILY HISTORY NOTED AND REVIEWED    REVIEW OF SYSTEMS: above    PHYSICAL EXAM    /81  Pulse 75  Temp 98.2  F (36.8  C) (Oral)  Ht 5' 3\" (1.6 m)  Wt 150 lb (68 kg)  SpO2 100%  Breastfeeding? No  BMI 26.57 kg/m2    Patient appears non toxic  No exam but d/w patient above, reviewed dc notes.    ASSESSMENT:  1. Met lung ca to spine and liver, not candidate for chemo per onc, has close and reg onc follow up  2. Depression, on meds, stable  3. Scleroderma, affecting her treatment options  4. xrt induced esoph, improved    PLAN:  Follow up onc  Follow up paliative care  Try fiber daily for gi c/o  Call if I can help    Juanjo Castañeda M.D.        "

## 2017-04-17 NOTE — NURSING NOTE
"Chief Complaint   Patient presents with     Hospital F/U       Initial /81  Pulse 75  Temp 98.2  F (36.8  C) (Oral)  Ht 5' 3\" (1.6 m)  Wt 150 lb (68 kg)  SpO2 100%  Breastfeeding? No  BMI 26.57 kg/m2 Estimated body mass index is 26.57 kg/(m^2) as calculated from the following:    Height as of this encounter: 5' 3\" (1.6 m).    Weight as of this encounter: 150 lb (68 kg).  Medication Reconciliation: complete   Vidhi SMITH CMA      "

## 2017-04-21 NOTE — TELEPHONE ENCOUNTER
Reason for Call:  Home Health Care    [facility] called regarding (reason for call): Orders    Orders are needed for this patient. PT    PT: 2 x week for 3 weeks for Pain Mgmt, Strength, balance,gate & transfers    Pt Provider:     Phone Number Homecare Nurse can be reached at: [phone]    Can we leave a detailed message on this number? YES    Best Time: anytime    Call taken on 4/21/2017 at 2:06 PM by Subhash Navarro

## 2017-04-21 NOTE — TELEPHONE ENCOUNTER
Call back to Karina with [facility].  Reviewed last hospital d/c summaries that included advisement for home PT as described below.  VO given, Karina has no further questions.  Elaine Bernstein RN

## 2017-04-26 NOTE — TELEPHONE ENCOUNTER
Returned call to Nadege with [facility]. No answer, message on voicemail too vague to leave a detailed message, left message to call back.    Karina Boo RN

## 2017-04-26 NOTE — TELEPHONE ENCOUNTER
Call received from RUDI Wolfe with [facility]. Given verbal ok for the requested OT orders as noted below.  Orders will be faxed to PCP for review and signature.    Karina Boo RN

## 2017-04-26 NOTE — TELEPHONE ENCOUNTER
Reason for Call:  Home Health Care    Nadege [facility] called regarding (reason for call):     Orders are needed for this patient.     PT:     OT: 1 more visit this week, 2x/3w for building endurance exercises    Skilled Nursing:     Pt Provider: Dr. Castañeda    Phone Number Homecare Nurse can be reached at: [phone]    Can we leave a detailed message on this number? YES    Phone number patient can be reached at: Home number on file [phone] (home)    Best Time: any    Call taken on 4/26/2017 at 9:13 AM by Leah Padilla

## 2017-05-02 NOTE — TELEPHONE ENCOUNTER
I called and spoke with Harley (Home Care)  OKay to continue on taking 90 mg Nifedipine daily.   Updated medication list.    Dot Chamberlain RN

## 2017-05-02 NOTE — TELEPHONE ENCOUNTER
If has been on 60mg continue that, but if has been on 30mg continue that    Juanjo Castañeda M.D.

## 2017-05-02 NOTE — TELEPHONE ENCOUNTER
I called and spoke with Harley (Home Care)  He wants clarification on which dose of Nifedipine the patient should be taking--  1.) Dr. Jaime down in [place] ([place]?) had prescribed Nifedipine (porcardia) 30 mg   2.) Dr. Castañeda is prescribing Nifedipine (adalat) 60 mg      Home Care also mentioned that patient is considering hospice in next couple weeks.    PCP: Please advise, which dose of Nifedipine patient should be taking so home care is dispensing proper medication.    Dot Chamberlain RN

## 2017-05-02 NOTE — TELEPHONE ENCOUNTER
Reason for Call:  NIFEdipine (ADALAT CC) 60 MG 24 hr tablet    Detailed comments: Harley from [facility] is calling because patient also has prescription for the medication from Dr. Jaime from [facility]    Phone Number Patient can be reached at: Other phone number:  [phone]    Best Time: ASAP     Can we leave a detailed message on this number? YES    Call taken on 5/2/2017 at 2:16 PM by Lucy Almonte

## 2017-05-02 NOTE — TELEPHONE ENCOUNTER
I called and spoke with Harley.   Harley provided further clarification.   The patient reported to him that she is taking Nifedipine 60 mg AND 30 mg together (90 mg total daily) and has been for extended time.     Please advise.    Dot Chamberlain RN

## 2017-05-03 NOTE — PROGRESS NOTES
Pt missed phone teletrace on 11/28/16. At that time she was at [facility] undergoing treatment for CA. Left a voice message on 2/28/17 asking pt to call back regarding follow up and received no response. Mailed 3 month letter. Huber IBRAHIM

## 2017-06-29 ENCOUNTER — TELEPHONE (OUTPATIENT)
Dept: FAMILY MEDICINE | Facility: CLINIC | Age: 82
End: 2017-06-29

## 2017-06-29 NOTE — TELEPHONE ENCOUNTER
Please call [facility] re this patient, did she just pass away, if so why was I not notified, please route back to me    Thanks    Juanjo Castañeda M.D.

## 2017-06-29 NOTE — TELEPHONE ENCOUNTER
The case manage will be calling you back.  Yes, she passed away yesterday, but they did not have you as the PCP on the file.   Kerry Casillas MA